# Patient Record
Sex: FEMALE | Race: WHITE | URBAN - METROPOLITAN AREA
[De-identification: names, ages, dates, MRNs, and addresses within clinical notes are randomized per-mention and may not be internally consistent; named-entity substitution may affect disease eponyms.]

---

## 2024-04-23 ENCOUNTER — TELEPHONE (OUTPATIENT)
Age: 79
End: 2024-04-23

## 2024-04-23 NOTE — TELEPHONE ENCOUNTER
----- Message from Arely Albarado sent at 4/18/2024  3:06 PM EDT -----  Subject: Message to Provider    QUESTIONS  Information for Provider? New patient has appt scheduled with Dr. Medley on   9/12/2024. Her daughter lEis Nash is a current patient of Dr. Medley.    has stepped in due to dementia & the daughter needs to get   her seen as soon as possible.   ---------------------------------------------------------------------------  --------------  CALL BACK INFO  [phone]; OK to leave message on voicemail  ---------------------------------------------------------------------------  --------------  SCRIPT ANSWERS  Relationship to Patient? Covered Entity  Covered Entity Type? Other  Other Covered Entity Type? daughter  Representative Name? Elis Nash